# Patient Record
Sex: FEMALE | Race: WHITE | Employment: UNEMPLOYED | URBAN - METROPOLITAN AREA
[De-identification: names, ages, dates, MRNs, and addresses within clinical notes are randomized per-mention and may not be internally consistent; named-entity substitution may affect disease eponyms.]

---

## 2017-10-24 RX ORDER — MONTELUKAST SODIUM 10 MG/1
TABLET ORAL
Qty: 90 TAB | Status: SHIPPED | OUTPATIENT
Start: 2017-10-24 | End: 2019-01-16 | Stop reason: SDUPTHER

## 2018-01-17 ENCOUNTER — OFFICE VISIT (OUTPATIENT)
Dept: ORTHOPEDIC SURGERY | Age: 78
End: 2018-01-17

## 2018-01-17 VITALS
DIASTOLIC BLOOD PRESSURE: 81 MMHG | HEIGHT: 64 IN | HEART RATE: 87 BPM | OXYGEN SATURATION: 94 % | BODY MASS INDEX: 19.81 KG/M2 | WEIGHT: 116 LBS | SYSTOLIC BLOOD PRESSURE: 135 MMHG | RESPIRATION RATE: 16 BRPM

## 2018-01-17 DIAGNOSIS — M79.642 LEFT HAND PAIN: ICD-10-CM

## 2018-01-17 DIAGNOSIS — M18.12 PRIMARY OSTEOARTHRITIS OF FIRST CARPOMETACARPAL JOINT OF LEFT HAND: Primary | ICD-10-CM

## 2018-01-17 DIAGNOSIS — M79.89 SWELLING OF LEFT THUMB: ICD-10-CM

## 2018-01-17 DIAGNOSIS — M85.89 OSTEOPENIA OF MULTIPLE SITES: ICD-10-CM

## 2018-01-17 DIAGNOSIS — M10.9 ACUTE GOUT, UNSPECIFIED CAUSE, UNSPECIFIED SITE: ICD-10-CM

## 2018-01-17 RX ORDER — INDOMETHACIN 50 MG/1
50 CAPSULE ORAL 3 TIMES DAILY
Qty: 60 CAP | Refills: 2 | Status: SHIPPED | OUTPATIENT
Start: 2018-01-17 | End: 2018-01-18 | Stop reason: DRUGHIGH

## 2018-01-17 RX ORDER — BETAMETHASONE SODIUM PHOSPHATE AND BETAMETHASONE ACETATE 3; 3 MG/ML; MG/ML
6 INJECTION, SUSPENSION INTRA-ARTICULAR; INTRALESIONAL; INTRAMUSCULAR; SOFT TISSUE ONCE
Qty: 0.5 ML | Refills: 0
Start: 2018-01-17 | End: 2018-01-17

## 2018-01-17 RX ORDER — BUPIVACAINE HYDROCHLORIDE 2.5 MG/ML
0.5 INJECTION, SOLUTION EPIDURAL; INFILTRATION; INTRACAUDAL ONCE
Qty: 0.5 ML | Refills: 0
Start: 2018-01-17 | End: 2018-01-17

## 2018-01-17 NOTE — PROGRESS NOTES
Patient: Shahla Escobar                MRN: [id number]       SSN: [id number]  YOB: 1940        AGE: 68 y.o. SEX: female    PCP: Raina Hi MD  01/17/18    Chief Complaint   Patient presents with    Hand Pain     lt     HISTORY:  Shahla Escobar is a 68 y.o. female who is seen for acute left hand pain. She reports pain since 1/13/18 with no definite injury. She states she woke up with pain and swelling at the base of her left thumb. She denies any h/o gout. She was previously seen for left shoulder and neck pain. She notes increased cervical trapezius tenderness since 10/14/16. She states that she may have strained her shoulder and neck moving sandbags in preparation for the recent Zebedee Shah. She has been taking Motrin for pain with benefit. She was previously seen for right hand pain. She sustained work related injuries when she fell in a parking lot 9 years ago at the [place] [address] in [place]. She used her hands for brace her fall. She now has constant pain and stiffness in her right index finger. She uses her hands a great deal while playing piano and English hand bells. She also reports right foot pain, for which she sees Dr. Jose Gonzales. She has previously been treated for hip and neck pain which are much better. Pain Assessment  1/17/2018   Location of Pain Hand   Location Modifiers Left   Severity of Pain 6   Quality of Pain Throbbing;Aching   Duration of Pain Persistent   Frequency of Pain Intermittent   Aggravating Factors -   Limiting Behavior -   Relieving Factors -   Result of Injury No     Occupation, etc:  Ms. Leary Schirmer is her 's primary caregiver. Lethia Brunner had a major thoracic spinal procedure for correction of severe kyphosis at [facility]. She plays piano and English hand bells. She is right-handed. She has a twin sister who owns a house in [facility] [place] in [place]. 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 She also uses public transportation to get around. She has used public transportation since she retired 5 years ago. She dos not walk much anymore. She has recently lost about 5 pounds. Last 3 Recorded Weights in this Encounter    01/17/18 0916   Weight: 116 lb (52.6 kg)     Body mass index is 20.23 kg/(m^2). Patient Active Problem List   Diagnosis Code    Asthma J45.909    Allergic rhinitis J30.9     REVIEW OF SYSTEMS: All Below are Negative except: See HPI   Constitutional: negative for fever, chills, and weight loss. Cardiovascular: negative for chest pain, claudication, leg swelling, SOB, PACHECO   Gastrointestinal: Negative for pain, N/V/C/D, Blood in stool or urine, dysuria,  hematuria, incontinence, pelvic pain. Musculoskeletal: See HPI   Neurological: Negative for dizziness and weakness. Negative for headaches, Visual changes, confusion, seizures   Phychiatric/Behavioral: Negative for depression, memory loss, substance  abuse. Extremities: Negative for hair changes, rash, or skin lesion changes. Hematologic: Negative for bleeding problems, bruising, pallor or swollen lymph  nodes   Peripheral Vascular: No calf pain, no circulation deficits. Social History     Social History    Marital status:      Spouse name: N/A    Number of children: N/A    Years of education: N/A     Occupational History    Not on file.      Social History Main Topics    Smoking status: Never Smoker    Smokeless tobacco: Never Used    Alcohol use 2.0 oz/week     4 Glasses of wine per week    Drug use: No    Sexual activity: Not on file     Other Topics Concern    Not on file     Social History Narrative     Allergies   Allergen Reactions    Iodinated Contrast- Oral And Iv Dye Rash    Sulfa (Sulfonamide Antibiotics) Not Reported This Time and Hives    Tetracycline Hives     Current Outpatient Prescriptions   Medication Sig    montelukast (SINGULAIR) 10 mg tablet take 1 tablet by mouth daily at bedtime    PROAIR HFA 90 mcg/actuation inhaler inhale 2 puffs by mouth every 6 hours if needed    montelukast (SINGULAIR) 10 mg tablet take 1 tablet by mouth daily at bedtime    cholecalciferol (VITAMIN D3) 1,000 unit tablet 2,000 Units.  mometasone (NASONEX) 50 mcg/actuation nasal spray 2 sprays by Both Nostrils route daily.  aspirin 81 mg tablet Take 81 mg by mouth daily.  CALCIUM CARBONATE/VITAMIN D3 (CALCIUM + D PO) Take  by mouth daily.  CRANBERRY EXTRACT (CRANBERRY PO) Take  by mouth daily.  ascorbic acid (VITAMIN C) 1,000 mg tablet Take  by mouth daily.  ibuprofen (MOTRIN IB) 200 mg tablet Take  by mouth as needed.  simvastatin (ZOCOR) 5 mg tablet Take  by mouth nightly. No current facility-administered medications for this visit. PHYSICAL EXAMINATION:  Visit Vitals    /81 (BP 1 Location: Left arm, BP Patient Position: Sitting)    Pulse 87    Resp 16    Ht 5' 3.5\" (1.613 m)    Wt 116 lb (52.6 kg)    SpO2 94%    BMI 20.23 kg/m2     ORTHO EXAMINATION:  Examination Neck   Skin Intact   Tenderness +, left paracervical trapezius   Tightness +, left paracervical trapezius   Flexion Decreased 25%   Extension Decreased 25%   Lateral bend left Normal   Lateral bend right Normal   Masses -   Biceps reflex Normal   Triceps reflex Normal   Brachioradialis reflex Normal   Pain with neck extension and turning to left    Examination Right shoulder Left shoulder   Skin Intact Intact   Effusion - -   Biceps deformity - -   Atrophy - -   AC joint tenderness - -   Acromial tenderness + +   Biceps tenderness - -   Forward flexion/Elevation  160   Active abduction  160   External rotation ROM 30 30   Internal rotation ROM 70 70   Apprehension - -   Impingement - -   Drop Arm Test - -   Neurovascular Intact Intact     Examination Left Hand Right Hand   Skin Small linear scar from old injury.  Slight redness over basal joint and radial aspect of wrist Intact Deformity - +, bony enlargement, ulnar deviation of the DIP joint of the index finger   Swelling - -   Tenderness +, basal joint -   Finger flexion Full Full   Finger extension Full Full   Sensation Normal Normal   Capillary refill Normal Normal   Heberden's nodes + +   Dupuytren's - -   15-0 for DIP motion of the right index finger  +, mild thenar atrophy left     Examination Right Ankle/Foot   Skin Intact   Swelling -   Dorsiflexion 10   Plantarflexion 25   Deformity Large hallux valgus   Inversion laxity -   Anterior drawer -   Medial tenderness -   Lateral tenderness -   Heel cord Intact   Sensation Intact   Bunion +   Toe nails Normal   Capillary refill Normal     PROCEDURE:  After discussing treatment options, patient's left basal joint was injected with 1/2 cc Marcaine and 1/2 cc Celestone. Chart reviewed for the following:   Clara Vega MD, have reviewed the History, Physical and updated the Allergic reactions for [address] performed immediately prior to start of procedure:  Clara Vega MD, have performed the following reviews on [address] [place] [address] prior to the start of the procedure:            * Patient was identified by name and date of birth   * Agreement on procedure being performed was verified  * Risks and Benefits explained to the patient  * Procedure site verified and marked as necessary  * Patient was positioned for comfort  * Consent was obtained     Time: 10:13 AM     Date of procedure: 1/17/2018    Procedure performed by:  Farhana Ochoa MD    Ms. Lynn tolerated the procedure well with no complications. DEXA BONE DENSITY STUDY 1/17/12  IMPRESSION:  BMD MEASURES CONSISTENT WITH OSTEOPENIA. RADIOGRAPHS:  XR LEFT HAND 1/17/18  IMPRESSION:  Three views - No fractures, near complete basal joint space narrowing, osteopenia by xray.     XR LEFT SHOULDER 10/19/16  IMPRESSION: No fractures, mild acromioclavicular narrowing, no glenohumeral narrowing, no calcific densities, tiny inferior humeral head osteophyte. XR CERVICAL SPINE 10/19/16  IMPRESSION:  No fractures, C4-7 disc space narrowing, no prevertebral swelling, no subluxations, calcification of laryngeal cartilages. IMPRESSION:      ICD-10-CM ICD-9-CM    1. Primary osteoarthritis of first carpometacarpal joint of left hand M18.12 715.14 betamethasone (CELESTONE SOLUSPAN) 6 mg/mL injection      BETAMETHASONE ACETATE & SODIUM PHOSPHATE INJECTION 3 MG EA.      DRAIN/INJECT SMALL JOINT/BURSA      bupivacaine, PF, (MARCAINE, PF,) 0.25 % (2.5 mg/mL) injection      indomethacin (INDOCIN) 50 mg capsule      THUMB SPLINT   2. Left hand pain M79.642 729.5 AMB POC XRAY, HAND; 3+ VIEWS      indomethacin (INDOCIN) 50 mg capsule   3. Swelling of left thumb M79.89 729.81 indomethacin (INDOCIN) 50 mg capsule   4. Acute gout, unspecified cause, unspecified site M10.9 274.01 indomethacin (INDOCIN) 50 mg capsule      URIC ACID   5. Osteopenia of multiple sites M85.89 733.90      PLAN:   After discussing treatment options, patient's left basal joint was injected with 1/2 cc Marcaine and 1/2 cc Celestone. There is no need for surgery at this time. I will see her back as needed. She was provided with a prescription for indomethacin. She was provided with a lab slip for a uric acid value with next blood work at Dr. Venus Pritchard office. She was provided with a thumb spica splint today.      Scribed by Mecca Salvador ([facility]) as dictated by Roberta Petty MD

## 2018-01-17 NOTE — PATIENT INSTRUCTIONS
Thumb Arthritis: Exercises  Your Care Instructions  Here are some examples of exercises for thumb arthritis. Start each exercise slowly. Ease off the exercise if you start to have pain. Your doctor or your physical or occupational therapist will tell you when you can start these exercises and which ones will work best for you. How to do the exercises  Thumb IP flexion    1. Place your forearm and hand on a table with your affected thumb pointing up. 2. With your other hand, hold your thumb steady just below the joint nearest your thumbnail. 3. Bend the tip of your thumb downward, then straighten it. 4. Repeat 8 to 12 times. 5. Switch hands and repeat steps 1 through 4, even if only one thumb is sore. Thumb MP flexion    1. Place your forearm and hand on a table with your affected thumb pointing up. 2. With your other hand, hold the base of your thumb and palm steady. 3. Bend your thumb downward where it meets your palm, then straighten it. 4. Repeat 8 to 12 times. 5. Switch hands and repeat steps 1 through 4, even if only one thumb is sore. Thumb opposition    1. With your affected hand, point your fingers and thumb straight up. Your wrist should be relaxed, following the line of your fingers and thumb. 2. Touch your affected thumb to each finger, one finger at a time. This will look like an \"okay\" sign, but try to keep your other fingers straight and pointing upward as much as you can. 3. Repeat 8 to 12 times. 4. Switch hands and repeat steps 1 through 3, even if only one thumb is sore. Follow-up care is a key part of your treatment and safety. Be sure to make and go to all appointments, and call your doctor if you are having problems. It's also a good idea to know your test results and keep a list of the medicines you take. Where can you learn more? Go to http://zenia-dwayne.info/. Enter W468 in the search box to learn more about \"Thumb Arthritis: Exercises. \"  Current as of: March 21, 2017  Content Version: 11.4  © 2733-9618 [employer]. Care instructions adapted under license by [employer] (which disclaims liability or warranty for this information). If you have questions about a medical condition or this instruction, always ask your healthcare professional. [address] any warranty or liability for your use of this information. Indomethacin (By mouth)   Indomethacin (in-pelayo-METH-a-sin)  Treats pain. This is an NSAID. Brand Name(s): Indocin, Indocin SR, Tivorbex   There may be other brand names for this medicine. When This Medicine Should Not Be Used: This medicine is not right for everyone. Do not use it if you had an allergic reaction (including asthma) to indomethacin, aspirin, or other NSAIDs. Do not use it if you have had a heart surgery (such as coronary artery bypass graft). How to Use This Medicine:   Capsule, Long Acting Capsule, Liquid  · Take your medicine as directed. Your dose may need to be changed several times to find what works best for you. · It is best to take this medicine with food, milk, or antacids so it does not upset your stomach. · Swallow the capsule whole. Do not open, crush, break, or chew it. · Swallow the extended-release capsule whole. Do not crush, break, or chew it. · Oral liquid: Measure the oral liquid medicine with a marked measuring spoon, oral syringe, or medicine cup. Shake well before using. · This medicine should come with a Medication Guide. Ask your pharmacist for a copy if you do not have one. · Missed dose: Take a dose as soon as you remember. If it is almost time for your next dose, wait until then and take a regular dose. Do not take extra medicine to make up for a missed dose. · Store the medicine in a closed container at room temperature, away from heat, moisture, and direct light. Do not freeze the oral liquid.   Drugs and Foods to Avoid:   Ask your doctor or pharmacist before using any other medicine, including over-the-counter medicines, vitamins, and herbal products. · Do not use any other NSAID medicine unless your doctor says it is okay. Some other NSAIDs are aspirin, celecoxib, diclofenac, diflunisal, ibuprofen, naproxen, or salsalate. · Some foods and medicines can affect how indomethacin works. Tell your doctor if you are using any of the following:  ¨ Cyclosporine, digoxin, lithium, methotrexate, pemetrexed, or probenecid  ¨ Blood pressure medicine  ¨ Blood thinner (including warfarin)  ¨ Diuretic (water pill)  ¨ Medicine to treat depression  ¨ Steroid medicine  Warnings While Using This Medicine:   · Tell your doctor if you are pregnant or breastfeeding. Do not use this medicine during the later part of a pregnancy, unless your doctor tells you to. · Tell your doctor if you have kidney disease, liver disease, asthma, bleeding problems, heart disease, high blood pressure, heart failure, or a history of stomach or bowel problems (including bleeding or ulcers), depression, mental illness, epilepsy, or Parkinson disease. Tell your doctor if you smoke or drink alcohol. · This medicine may cause the following problems:  ¨ Higher risk of blood clots, heart attack, stroke, or heart failure  ¨ Bleeding and ulcers in your stomach or intestines  ¨ Liver damage  ¨ Kidney damage  ¨ Serious skin reactions  ¨ Changes in vision  · Ovulation may be delayed in some women while this medicine is being used. Talk to your doctor if you have concerns about this. · This medicine may make you drowsy. Do not drive or do anything else that could be dangerous until you know how this medicine affects you. · Your doctor will do lab tests at regular visits to check on the effects of this medicine. Keep all appointments. · Keep all medicine out of the reach of children. Never share your medicine with anyone.   Possible Side Effects While Using This Medicine:   Call your doctor right away if you notice any of these side effects:  · Allergic reaction: Itching or hives, swelling in your face or hands, swelling or tingling in your mouth or throat, chest tightness, trouble breathing  · Blistering, peeling, or red skin rash  · Bloody or black, tarry stools, severe stomach pain, vomiting blood or something that looks like coffee grounds  · Change in how much or how often you urinate  · Chest pain that may spread, trouble breathing, unusual sweating, fainting  · Dark urine or pale stools, nausea, vomiting, loss of appetite, stomach pain, yellow skin or eyes  · Numbness or weakness on one side of your body, sudden or severe headache, problems with vision, speech, or walking  · Rapid weight gain, swelling in your hands, ankles, or feet  · Unusual bleeding, bruising, or weakness  If you notice these less serious side effects, talk with your doctor:   · Constipation, gas, stomach pain  · Headache or drowsiness  If you notice other side effects that you think are caused by this medicine, tell your doctor. Call your doctor for medical advice about side effects. You may report side effects to FDA at 4-773-FDA-0379  © 2017 [address]  Information is for End User's use only and may not be sold, redistributed or otherwise used for commercial purposes. The above information is an  only. It is not intended as medical advice for individual conditions or treatments. Talk to your doctor, nurse or pharmacist before following any medical regimen to see if it is safe and effective for you.

## 2018-01-17 NOTE — MR AVS SNAPSHOT
[address] [facility], [address] [facility] 
[phone] Patient: Abimael Wheelre MRN:  [id number] Visit Information Date & Time Provider Department Dept. Phone Encounter #  
 1/17/2018  9:30 AM Kaylee Sloan MD [facility] [phone] [id number] Follow-up Instructions Return if symptoms worsen or fail to improve. Upcoming Health Maintenance Date Due COLONOSCOPY 7/21/1958 DTaP/Tdap/Td series (1 - Tdap) 7/21/1961 ZOSTER VACCINE AGE 60> 5/21/2000 GLAUCOMA SCREENING Q2Y 7/21/2005 Pneumococcal 65+ Low/Medium Risk (1 of 2 - PCV13) 7/21/2005 MEDICARE YEARLY EXAM 7/21/2005 Influenza Age 5 to Adult 8/1/2017 Allergies as of 1/17/2018  Review Complete On: 1/17/2018 By: Kaylee Sloan MD  
  
 Severity Noted Reaction Type Reactions Iodinated Contrast- Oral And Iv Dye  05/25/2016    Rash  
 Sulfa (Sulfonamide Antibiotics)    Not Reported This Time, Hives Tetracycline  05/25/2016    Hives Current Immunizations  Never Reviewed Name Date Pneumococcal Polysaccharide (PPSV-23)  Incomplete,  Incomplete Not reviewed this visit You Were Diagnosed With   
  
 Codes Comments Primary osteoarthritis of first carpometacarpal joint of left hand    -  Primary ICD-10-CM: M18.12 
ICD-9-CM: 715.14 Left hand pain     ICD-10-CM: W25.197 ICD-9-CM: 729.5 Swelling of left thumb     ICD-10-CM: M79.89 ICD-9-CM: 729.81 Acute gout, unspecified cause, unspecified site     ICD-10-CM: M10.9 ICD-9-CM: 274.01 Osteopenia of multiple sites     ICD-10-CM: M85.89 ICD-9-CM: 733.90 Vitals BP Pulse Resp Height(growth percentile) Weight(growth percentile) SpO2  
 135/81 (BP 1 Location: Left arm, BP Patient Position: Sitting) 87 16 5' 3.5\" (1.613 m) 116 lb (52.6 kg) 94% BMI OB Status Smoking Status 20.23 kg/m2 Hysterectomy Never Smoker Vitals History BMI and BSA Data Body Mass Index Body Surface Area  
 20.23 kg/m 2 1.54 m 2 Preferred Pharmacy Pharmacy Name Phone [facility] [address], [address] [facility] [phone] Your Updated Medication List  
  
   
This list is accurate as of: 1/17/18 10:30 AM.  Always use your most recent med list.  
  
  
  
  
 aspirin 81 mg tablet Take 81 mg by mouth daily. betamethasone 6 mg/mL injection Commonly known as:  CELESTONE SOLUSPAN  
1 mL by Intra artICUlar route once for 1 dose. bupivacaine (PF) 0.25 % (2.5 mg/mL) injection Commonly known as:  MARCAINE (PF)  
0.5 mL by Intra artICUlar route once for 1 dose. CALCIUM + D PO Take  by mouth daily. cholecalciferol 1,000 unit tablet Commonly known as:  VITAMIN D3  
2,000 Units. CRANBERRY PO Take  by mouth daily. indomethacin 50 mg capsule Commonly known as:  INDOCIN Take 1 Cap by mouth three (3) times daily for 90 days. mometasone 50 mcg/actuation nasal spray Commonly known as:  NASONEX  
2 sprays by Both Nostrils route daily. * montelukast 10 mg tablet Commonly known as:  SINGULAIR  
take 1 tablet by mouth daily at bedtime * montelukast 10 mg tablet Commonly known as:  SINGULAIR  
take 1 tablet by mouth daily at bedtime MOTRIN  mg tablet Generic drug:  ibuprofen Take  by mouth as needed. PROAIR HFA 90 mcg/actuation inhaler Generic drug:  albuterol  
inhale 2 puffs by mouth every 6 hours if needed VITAMIN C 1,000 mg tablet Generic drug:  ascorbic acid (vitamin C) Take  by mouth daily. ZOCOR 5 mg tablet Generic drug:  simvastatin Take  by mouth nightly. * Notice: This list has 2 medication(s) that are the same as other medications prescribed for you.  Read the directions carefully, and ask your doctor or other care provider to review them with you. Prescriptions Sent to Pharmacy Refills  
 indomethacin (INDOCIN) 50 mg capsule 2 Sig: Take 1 Cap by mouth three (3) times daily for 90 days. Class: Normal  
 Pharmacy: [id number] [address], 9 [facility] #: [phone] Route: Oral  
  
We Performed the Following AMB POC XRAY, HAND; 3+ VIEWS [58465 CPT(R)] BETAMETHASONE ACETATE & SODIUM PHOSPHATE INJECTION 3 MG EA. [ [facility]] DRAIN/INJECT SMALL JOINT/BURSA [id number] CPT(R)] THUMB SPLINT [CPL344 Custom] Comments: LEFT THUMB SPLINT Follow-up Instructions Return if symptoms worsen or fail to improve. To-Do List   
 01/17/2018 Lab:  URIC ACID Patient Instructions Thumb Arthritis: Exercises Your Care Instructions Here are some examples of exercises for thumb arthritis. Start each exercise slowly. Ease off the exercise if you start to have pain. Your doctor or your physical or occupational therapist will tell you when you can start these exercises and which ones will work best for you. How to do the exercises Thumb IP flexion 1. Place your forearm and hand on a table with your affected thumb pointing up. 2. With your other hand, hold your thumb steady just below the joint nearest your thumbnail. 3. Bend the tip of your thumb downward, then straighten it. 4. Repeat 8 to 12 times. 5. Switch hands and repeat steps 1 through 4, even if only one thumb is sore. Thumb MP flexion 1. Place your forearm and hand on a table with your affected thumb pointing up. 2. With your other hand, hold the base of your thumb and palm steady. 3. Bend your thumb downward where it meets your palm, then straighten it. 4. Repeat 8 to 12 times. 5. Switch hands and repeat steps 1 through 4, even if only one thumb is sore. Thumb opposition 1. With your affected hand, point your fingers and thumb straight up. Your wrist should be relaxed, following the line of your fingers and thumb. 2. Touch your affected thumb to each finger, one finger at a time. This will look like an \"okay\" sign, but try to keep your other fingers straight and pointing upward as much as you can. 3. Repeat 8 to 12 times. 4. Switch hands and repeat steps 1 through 3, even if only one thumb is sore. Follow-up care is a key part of your treatment and safety. Be sure to make and go to all appointments, and call your doctor if you are having problems. It's also a good idea to know your test results and keep a list of the medicines you take. Where can you learn more? Go to http://zenia-dwayne.info/. Enter A966 in the search box to learn more about \"Thumb Arthritis: Exercises. \" Current as of: March 21, 2017 Content Version: 11.4 © 6359-3259 [employer]. Care instructions adapted under license by [employer] (which disclaims liability or warranty for this information). If you have questions about a medical condition or this instruction, always ask your healthcare professional. [place] [address] any warranty or liability for your use of this information. Indomethacin (By mouth) Indomethacin (in-pelayo-METH-a-sin) Treats pain. This is an NSAID. Brand Name(s): Indocin, Indocin SR, Tivorbex There may be other brand names for this medicine. When This Medicine Should Not Be Used: This medicine is not right for everyone. Do not use it if you had an allergic reaction (including asthma) to indomethacin, aspirin, or other NSAIDs. Do not use it if you have had a heart surgery (such as coronary artery bypass graft). How to Use This Medicine:  
Capsule, Long Acting Capsule, Liquid · Take your medicine as directed. Your dose may need to be changed several times to find what works best for you. · It is best to take this medicine with food, milk, or antacids so it does not upset your stomach. · Swallow the capsule whole. Do not open, crush, break, or chew it. · Swallow the extended-release capsule whole. Do not crush, break, or chew it. · Oral liquid: Measure the oral liquid medicine with a marked measuring spoon, oral syringe, or medicine cup. Shake well before using. · This medicine should come with a Medication Guide. Ask your pharmacist for a copy if you do not have one. · Missed dose: Take a dose as soon as you remember. If it is almost time for your next dose, wait until then and take a regular dose. Do not take extra medicine to make up for a missed dose. · Store the medicine in a closed container at room temperature, away from heat, moisture, and direct light. Do not freeze the oral liquid. Drugs and Foods to Avoid: Ask your doctor or pharmacist before using any other medicine, including over-the-counter medicines, vitamins, and herbal products. · Do not use any other NSAID medicine unless your doctor says it is okay. Some other NSAIDs are aspirin, celecoxib, diclofenac, diflunisal, ibuprofen, naproxen, or salsalate. · Some foods and medicines can affect how indomethacin works. Tell your doctor if you are using any of the following: ¨ Cyclosporine, digoxin, lithium, methotrexate, pemetrexed, or probenecid ¨ Blood pressure medicine ¨ Blood thinner (including warfarin) ¨ Diuretic (water pill) ¨ Medicine to treat depression Nida Felder Steroid medicine Warnings While Using This Medicine: · Tell your doctor if you are pregnant or breastfeeding. Do not use this medicine during the later part of a pregnancy, unless your doctor tells you to.  
· Tell your doctor if you have kidney disease, liver disease, asthma, bleeding problems, heart disease, high blood pressure, heart failure, or a history of stomach or bowel problems (including bleeding or ulcers), depression, mental illness, epilepsy, or Parkinson disease. Tell your doctor if you smoke or drink alcohol. · This medicine may cause the following problems: 
¨ Higher risk of blood clots, heart attack, stroke, or heart failure ¨ Bleeding and ulcers in your stomach or intestines ¨ Liver damage ¨ Kidney damage ¨ Serious skin reactions ¨ Changes in vision · Ovulation may be delayed in some women while this medicine is being used. Talk to your doctor if you have concerns about this. · This medicine may make you drowsy. Do not drive or do anything else that could be dangerous until you know how this medicine affects you. · Your doctor will do lab tests at regular visits to check on the effects of this medicine. Keep all appointments. · Keep all medicine out of the reach of children. Never share your medicine with anyone. Possible Side Effects While Using This Medicine:  
Call your doctor right away if you notice any of these side effects: · Allergic reaction: Itching or hives, swelling in your face or hands, swelling or tingling in your mouth or throat, chest tightness, trouble breathing · Blistering, peeling, or red skin rash · Bloody or black, tarry stools, severe stomach pain, vomiting blood or something that looks like coffee grounds · Change in how much or how often you urinate · Chest pain that may spread, trouble breathing, unusual sweating, fainting · Dark urine or pale stools, nausea, vomiting, loss of appetite, stomach pain, yellow skin or eyes · Numbness or weakness on one side of your body, sudden or severe headache, problems with vision, speech, or walking · Rapid weight gain, swelling in your hands, ankles, or feet · Unusual bleeding, bruising, or weakness If you notice these less serious side effects, talk with your doctor: · Constipation, gas, stomach pain · Headache or drowsiness If you notice other side effects that you think are caused by this medicine, tell your doctor. Call your doctor for medical advice about side effects. You may report side effects to FDA at 4-140-FDA-2271 © 2017 [address] Information is for End User's use only and may not be sold, redistributed or otherwise used for commercial purposes. The above information is an  only. It is not intended as medical advice for individual conditions or treatments. Talk to your doctor, nurse or pharmacist before following any medical regimen to see if it is safe and effective for you. Introducing [facility]! [facility] introduces Zane Prep patient portal. Now you can access parts of your medical record, email your doctor's office, and request medication refills online. 1. In your internet browser, go to [email or web address] 2. Click on the First Time User? Click Here link in the Sign In box. You will see the New Member Sign Up page. 3. Enter your Zane Prep Access Code exactly as it appears below. You will not need to use this code after youve completed the sign-up process. If you do not sign up before the expiration date, you must request a new code. · Zane Prep Access Code: [email or web address] Expires: 4/17/2018 10:30 AM 
 
4. Enter the last four digits of your Social Security Number (xxxx) and Date of Birth (mm/dd/yyyy) as indicated and click Submit. You will be taken to the next sign-up page. 5. Create a Zane Prep ID. This will be your Zane Prep login ID and cannot be changed, so think of one that is secure and easy to remember. 6. Create a Zane Prep password. You can change your password at any time. 7. Enter your Password Reset Question and Answer. This can be used at a later time if you forget your password. 8. Enter your e-mail address. You will receive e-mail notification when new information is available in [address] 9. Click Sign Up. You can now view and download portions of your medical record. 10. Click the Download Summary menu link to download a portable copy of your medical information. If you have questions, please visit the Frequently Asked Questions section of the [employer] website. Remember, [employer]One is NOT to be used for urgent needs. For medical emergencies, dial 911. Now available from your iPhone and Android! Please provide this summary of care documentation to your next provider. Your primary care clinician is listed as [place] [facility]. If you have any questions after today's visit, please call [phone].

## 2018-01-18 ENCOUNTER — TELEPHONE (OUTPATIENT)
Dept: ORTHOPEDIC SURGERY | Age: 78
End: 2018-01-18

## 2018-01-18 RX ORDER — INDOMETHACIN 50 MG/1
50 CAPSULE ORAL 2 TIMES DAILY WITH MEALS
Qty: 60 CAP | Refills: 1
Start: 2018-01-18 | End: 2018-03-19

## 2018-01-18 NOTE — TELEPHONE ENCOUNTER
Called and spoke to juancho Tomas at [facility] regarding this message. States that they are unable to fill due to insurance needing a prior auth for this medication. States the paperwork should have been faxed to the office. Called and spoke to patient. She doesn't want this medication as her motrin is working just fine. She is not sure why 50mg was sent in, but her Motrin is working fine for her    Will call Nidia back at the pharmacy to let her know to d/c this medication.

## 2018-01-18 NOTE — TELEPHONE ENCOUNTER
Patient says she took her Indocin rx into [facility] yesterday and they are unable to fill it. She doesn't know why except that the pharmacy said they are waiting on us to call back. She is very upset and doesn't want to wait all day. She thinks this is ridiculous and someone needs to call the pharmacy and her back immediately.   Patient can be reached at [phone]

## 2018-01-22 ENCOUNTER — HOSPITAL ENCOUNTER (OUTPATIENT)
Dept: LAB | Age: 78
Discharge: HOME | End: 2018-01-22
Payer: MEDICARE

## 2018-01-22 DIAGNOSIS — M10.9 ACUTE GOUT, UNSPECIFIED CAUSE, UNSPECIFIED SITE: ICD-10-CM

## 2018-01-22 LAB — URATE SERPL-MCNC: 2.9 MG/DL (ref 2.6–7.2)

## 2018-01-22 PROCEDURE — 36415 COLL VENOUS BLD VENIPUNCTURE: CPT | Performed by: SPECIALIST

## 2018-01-22 PROCEDURE — 84550 ASSAY OF BLOOD/URIC ACID: CPT | Performed by: SPECIALIST

## 2018-01-24 ENCOUNTER — OFFICE VISIT (OUTPATIENT)
Dept: ORTHOPEDIC SURGERY | Age: 78
End: 2018-01-24

## 2018-01-24 VITALS
SYSTOLIC BLOOD PRESSURE: 136 MMHG | OXYGEN SATURATION: 97 % | TEMPERATURE: 97.6 F | DIASTOLIC BLOOD PRESSURE: 56 MMHG | HEART RATE: 71 BPM | WEIGHT: 116 LBS | BODY MASS INDEX: 19.81 KG/M2 | HEIGHT: 64 IN

## 2018-01-24 DIAGNOSIS — M85.89 OSTEOPENIA OF MULTIPLE SITES: ICD-10-CM

## 2018-01-24 DIAGNOSIS — M79.642 LEFT HAND PAIN: Primary | ICD-10-CM

## 2018-01-24 DIAGNOSIS — M79.89 SWELLING OF LEFT THUMB: ICD-10-CM

## 2018-01-24 DIAGNOSIS — M18.12 PRIMARY OSTEOARTHRITIS OF FIRST CARPOMETACARPAL JOINT OF LEFT HAND: ICD-10-CM

## 2018-01-24 NOTE — PROGRESS NOTES
Patient: Shorty Velazquez                MRN: [id number]       SSN: [id number]  YOB: 1940        AGE: 68 y.o. SEX: female    PCP: Darci Wing MD  01/24/18    Chief Complaint   Patient presents with    Follow-up     left hand     HISTORY:  Shorty Velazquez is a 68 y.o. female who is seen for continued left hand pain. She reports pain since 1/13/18 with no definite injury. She states she woke up with pain and swelling at the base of her left thumb. She denies any h/o gout. She denies much relief from splinting or NSAIDS including indomethacin. Tylenol has worked in the past.    She was previously seen for left shoulder and neck pain. She notes increased cervical trapezius tenderness since 10/14/16. She states that she may have strained her shoulder and neck moving sandbags in preparation for the recent [place]. She has been taking Motrin for pain with benefit. She was previously seen for right hand pain. She sustained work related injuries when she fell in a parking lot 9 years ago at the [place] [address] in [facility]. She used her hands for brace her fall. She now has constant pain and stiffness in her right index finger. She uses her hands a great deal while playing piano and English hand bells. She also reports right foot pain, for which she sees Dr. Boni Goodman. She has previously been treated for hip and neck pain which are much better. Pain Assessment  1/24/2018   Location of Pain Hand   Location Modifiers Left   Severity of Pain 6   Quality of Pain Aching   Duration of Pain Persistent   Frequency of Pain Constant   Aggravating Factors Other (Comment)   Aggravating Factors Comment moving hand   Limiting Behavior Some   Relieving Factors NSAID   Result of Injury -     Occupation, etc:  Ms. Jyothi Rivera is her 's primary caregiver. Rubi Campbell had a major thoracic spinal procedure for correction of severe kyphosis at [facility].   She plays piano and English hand lili.  She is right-handed. She has a twin sister who owns a house in [place] [facility] [place] in [place]. 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 She also uses public transportation to get around. She has used public transportation since she retired 5 years ago. She dos not walk much anymore. She has recently lost about 5 pounds. Last 3 Recorded Weights in this Encounter    01/24/18 1057   Weight: 116 lb (52.6 kg)     Body mass index is 20.23 kg/(m^2). Patient Active Problem List   Diagnosis Code    Asthma J45.909    Allergic rhinitis J30.9     REVIEW OF SYSTEMS: All Below are Negative except: See HPI   Constitutional: negative for fever, chills, and weight loss. Cardiovascular: negative for chest pain, claudication, leg swelling, SOB, PACHECO   Gastrointestinal: Negative for pain, N/V/C/D, Blood in stool or urine, dysuria,  hematuria, incontinence, pelvic pain. Musculoskeletal: See HPI   Neurological: Negative for dizziness and weakness. Negative for headaches, Visual changes, confusion, seizures   Phychiatric/Behavioral: Negative for depression, memory loss, substance  abuse. Extremities: Negative for hair changes, rash, or skin lesion changes. Hematologic: Negative for bleeding problems, bruising, pallor or swollen lymph  nodes   Peripheral Vascular: No calf pain, no circulation deficits. Social History     Social History    Marital status:      Spouse name: N/A    Number of children: N/A    Years of education: N/A     Occupational History    Not on file.      Social History Main Topics    Smoking status: Never Smoker    Smokeless tobacco: Never Used    Alcohol use 2.0 oz/week     4 Glasses of wine per week    Drug use: No    Sexual activity: Not on file     Other Topics Concern    Not on file     Social History Narrative     Allergies   Allergen Reactions    Iodinated Contrast- Oral And Iv Dye Rash    Sulfa (Sulfonamide Antibiotics) Not Reported This Time and Hives    Tetracycline Hives     Current Outpatient Prescriptions   Medication Sig    indomethacin (INDOCIN) 50 mg capsule Take 1 Cap by mouth two (2) times daily (with meals) for 60 days.  montelukast (SINGULAIR) 10 mg tablet take 1 tablet by mouth daily at bedtime    PROAIR HFA 90 mcg/actuation inhaler inhale 2 puffs by mouth every 6 hours if needed    montelukast (SINGULAIR) 10 mg tablet take 1 tablet by mouth daily at bedtime    cholecalciferol (VITAMIN D3) 1,000 unit tablet 2,000 Units.  mometasone (NASONEX) 50 mcg/actuation nasal spray 2 sprays by Both Nostrils route daily.  aspirin 81 mg tablet Take 81 mg by mouth daily.  CALCIUM CARBONATE/VITAMIN D3 (CALCIUM + D PO) Take  by mouth daily.  CRANBERRY EXTRACT (CRANBERRY PO) Take  by mouth daily.  ascorbic acid (VITAMIN C) 1,000 mg tablet Take  by mouth daily.  ibuprofen (MOTRIN IB) 200 mg tablet Take  by mouth as needed.  simvastatin (ZOCOR) 5 mg tablet Take  by mouth nightly. No current facility-administered medications for this visit.       PHYSICAL EXAMINATION:  Visit Vitals    /56    Pulse 71    Temp 97.6 °F (36.4 °C) (Oral)    Ht 5' 3.5\" (1.613 m)    Wt 116 lb (52.6 kg)    SpO2 97%    BMI 20.23 kg/m2     ORTHO EXAMINATION:  Examination Neck   Skin Intact   Tenderness +, left paracervical trapezius   Tightness +, left paracervical trapezius   Flexion Decreased 25%   Extension Decreased 25%   Lateral bend left Normal   Lateral bend right Normal   Masses -   Biceps reflex Normal   Triceps reflex Normal   Brachioradialis reflex Normal   Pain with neck extension and turning to left    Examination Right shoulder Left shoulder   Skin Intact Intact   Effusion - -   Biceps deformity - -   Atrophy - -   AC joint tenderness - -   Acromial tenderness + +   Biceps tenderness - -   Forward flexion/Elevation  160   Active abduction  160   External rotation ROM 30 30   Internal rotation ROM 70 70   Apprehension - -   Impingement - -   Drop Arm Test - -   Neurovascular Intact Intact     Examination Left Hand Right Hand   Skin Small linear scar from old injury. Slight redness over basal joint and radial aspect of wrist Intact   Deformity - +, bony enlargement, ulnar deviation of the DIP joint of the index finger   Swelling - -   Tenderness +, basal joint -   Finger flexion Full Full   Finger extension Full Full   Sensation Normal Normal   Capillary refill Normal Normal   Heberden's nodes + +   Dupuytren's - -   15-0 for DIP motion of the right index finger  +, mild thenar atrophy left     Examination Right Ankle/Foot   Skin Intact   Swelling -   Dorsiflexion 10   Plantarflexion 25   Deformity Large hallux valgus   Inversion laxity -   Anterior drawer -   Medial tenderness -   Lateral tenderness -   Heel cord Intact   Sensation Intact   Bunion +   Toe nails Normal   Capillary refill Normal     LABS:   Component      Latest Ref Rng & Units 1/22/2018           2:01 PM   Uric acid      2.6 - 7.2 MG/DL 2.9     DEXA BONE DENSITY STUDY 1/17/12  IMPRESSION:  BMD MEASURES CONSISTENT WITH OSTEOPENIA. RADIOGRAPHS:  XR LEFT HAND 1/17/18  IMPRESSION:  Three views - No fractures, near complete basal joint space narrowing, osteopenia by xray. XR LEFT SHOULDER 10/19/16  IMPRESSION: No fractures, mild acromioclavicular narrowing, no glenohumeral narrowing, no calcific densities, tiny inferior humeral head osteophyte. XR CERVICAL SPINE 10/19/16  IMPRESSION:  No fractures, C4-7 disc space narrowing, no prevertebral swelling, no subluxations, calcification of laryngeal cartilages. IMPRESSION:      ICD-10-CM ICD-9-CM    1. Left hand pain M79.642 729.5    2. Primary osteoarthritis of first carpometacarpal joint of left hand M18.12 715.14    3. Swelling of left thumb M79.89 729.81    4.  Osteopenia of multiple sites M85.89 733.90      PLAN:   She will follow up with a hand surgeon for a possible basal joint arthoplasty. Restart use of Tylenol.     Scribed by Boubacar Lozano ([facility]) as dictated by Brian An MD

## 2018-01-24 NOTE — PATIENT INSTRUCTIONS
Thumb Arthritis: Exercises  Your Care Instructions  Here are some examples of exercises for thumb arthritis. Start each exercise slowly. Ease off the exercise if you start to have pain. Your doctor or your physical or occupational therapist will tell you when you can start these exercises and which ones will work best for you. How to do the exercises  Thumb IP flexion    1. Place your forearm and hand on a table with your affected thumb pointing up. 2. With your other hand, hold your thumb steady just below the joint nearest your thumbnail. 3. Bend the tip of your thumb downward, then straighten it. 4. Repeat 8 to 12 times. 5. Switch hands and repeat steps 1 through 4, even if only one thumb is sore. Thumb MP flexion    1. Place your forearm and hand on a table with your affected thumb pointing up. 2. With your other hand, hold the base of your thumb and palm steady. 3. Bend your thumb downward where it meets your palm, then straighten it. 4. Repeat 8 to 12 times. 5. Switch hands and repeat steps 1 through 4, even if only one thumb is sore. Thumb opposition    1. With your affected hand, point your fingers and thumb straight up. Your wrist should be relaxed, following the line of your fingers and thumb. 2. Touch your affected thumb to each finger, one finger at a time. This will look like an \"okay\" sign, but try to keep your other fingers straight and pointing upward as much as you can. 3. Repeat 8 to 12 times. 4. Switch hands and repeat steps 1 through 3, even if only one thumb is sore. Follow-up care is a key part of your treatment and safety. Be sure to make and go to all appointments, and call your doctor if you are having problems. It's also a good idea to know your test results and keep a list of the medicines you take. Where can you learn more? Go to http://zenia-dwayne.info/. Enter H686 in the search box to learn more about \"Thumb Arthritis: Exercises. \"  Current as of: March 21, 2017  Content Version: 11.4  © 1042-7786 [employer]wise, Incorporated. Care instructions adapted under license by [employer] (which disclaims liability or warranty for this information). If you have questions about a medical condition or this instruction, always ask your healthcare professional. [address] any warranty or liability for your use of this information.

## 2018-01-24 NOTE — MR AVS SNAPSHOT
[address] [place] [address], [address] 726 [facility] 
[phone] Patient: Shahla Escobar MRN:  [id number] Visit Information Date & Time Provider Department Dept. Phone Encounter #  
 1/24/2018 11:00 AM Mike Sewell, 27 [facility] [phone] [id number] Follow-up Instructions Return if symptoms worsen or fail to improve. Upcoming Health Maintenance Date Due COLONOSCOPY 7/21/1958 DTaP/Tdap/Td series (1 - Tdap) 7/21/1961 ZOSTER VACCINE AGE 60> 5/21/2000 GLAUCOMA SCREENING Q2Y 7/21/2005 Pneumococcal 65+ Low/Medium Risk (1 of 2 - PCV13) 7/21/2005 MEDICARE YEARLY EXAM 7/21/2005 Influenza Age 5 to Adult 8/1/2017 Allergies as of 1/24/2018  Review Complete On: 1/24/2018 By: Mike Sewell MD  
  
 Severity Noted Reaction Type Reactions Iodinated Contrast- Oral And Iv Dye  05/25/2016    Rash  
 Sulfa (Sulfonamide Antibiotics)    Not Reported This Time, Hives Tetracycline  05/25/2016    Hives Current Immunizations  Never Reviewed Name Date Pneumococcal Polysaccharide (PPSV-23)  Incomplete,  Incomplete Not reviewed this visit You Were Diagnosed With   
  
 Codes Comments Left hand pain    -  Primary ICD-10-CM: Q46.897 ICD-9-CM: 729.5 Primary osteoarthritis of first carpometacarpal joint of left hand     ICD-10-CM: M18.12 
ICD-9-CM: 715.14 Swelling of left thumb     ICD-10-CM: M79.89 ICD-9-CM: 729.81 Osteopenia of multiple sites     ICD-10-CM: M85.89 ICD-9-CM: 733.90 Vitals BP Pulse Temp Height(growth percentile) Weight(growth percentile) SpO2  
 136/56 71 97.6 °F (36.4 °C) (Oral) 5' 3.5\" (1.613 m) 116 lb (52.6 kg) 97% BMI OB Status Smoking Status 20.23 kg/m2 Hysterectomy Never Smoker BMI and BSA Data  Body Mass Index Body Surface Area  
 20.23 kg/m 2 1.54 m 2  
  
  
 Preferred Pharmacy Pharmacy Name Phone [facility] [address] [facility] [phone] Your Updated Medication List  
  
   
This list is accurate as of: 1/24/18 12:11 PM.  Always use your most recent med list.  
  
  
  
  
 aspirin 81 mg tablet Take 81 mg by mouth daily. CALCIUM + D PO Take  by mouth daily. cholecalciferol 1,000 unit tablet Commonly known as:  VITAMIN D3  
2,000 Units. CRANBERRY PO Take  by mouth daily. indomethacin 50 mg capsule Commonly known as:  INDOCIN Take 1 Cap by mouth two (2) times daily (with meals) for 60 days. mometasone 50 mcg/actuation nasal spray Commonly known as:  NASONEX  
2 sprays by Both Nostrils route daily. * montelukast 10 mg tablet Commonly known as:  SINGULAIR  
take 1 tablet by mouth daily at bedtime * montelukast 10 mg tablet Commonly known as:  SINGULAIR  
take 1 tablet by mouth daily at bedtime MOTRIN  mg tablet Generic drug:  ibuprofen Take  by mouth as needed. PROAIR HFA 90 mcg/actuation inhaler Generic drug:  albuterol  
inhale 2 puffs by mouth every 6 hours if needed VITAMIN C 1,000 mg tablet Generic drug:  ascorbic acid (vitamin C) Take  by mouth daily. ZOCOR 5 mg tablet Generic drug:  simvastatin Take  by mouth nightly. * Notice: This list has 2 medication(s) that are the same as other medications prescribed for you. Read the directions carefully, and ask your doctor or other care provider to review them with you. We Performed the Following REFERRAL TO ORTHOPEDICS [[id number] Custom] Follow-up Instructions Return if symptoms worsen or fail to improve. Referral Information Referral ID Referred By Referred To  
  
 [id number] Soraida GOODWIN Not Available Visits Status Start Date End Date 1 New Request 1/24/18 1/24/19 If your referral has a status of pending review or denied, additional information will be sent to support the outcome of this decision. Patient Instructions Thumb Arthritis: Exercises Your Care Instructions Here are some examples of exercises for thumb arthritis. Start each exercise slowly. Ease off the exercise if you start to have pain. Your doctor or your physical or occupational therapist will tell you when you can start these exercises and which ones will work best for you. How to do the exercises Thumb IP flexion 1. Place your forearm and hand on a table with your affected thumb pointing up. 2. With your other hand, hold your thumb steady just below the joint nearest your thumbnail. 3. Bend the tip of your thumb downward, then straighten it. 4. Repeat 8 to 12 times. 5. Switch hands and repeat steps 1 through 4, even if only one thumb is sore. Thumb MP flexion 1. Place your forearm and hand on a table with your affected thumb pointing up. 2. With your other hand, hold the base of your thumb and palm steady. 3. Bend your thumb downward where it meets your palm, then straighten it. 4. Repeat 8 to 12 times. 5. Switch hands and repeat steps 1 through 4, even if only one thumb is sore. Thumb opposition 1. With your affected hand, point your fingers and thumb straight up. Your wrist should be relaxed, following the line of your fingers and thumb. 2. Touch your affected thumb to each finger, one finger at a time. This will look like an \"okay\" sign, but try to keep your other fingers straight and pointing upward as much as you can. 3. Repeat 8 to 12 times. 4. Switch hands and repeat steps 1 through 3, even if only one thumb is sore. Follow-up care is a key part of your treatment and safety. Be sure to make and go to all appointments, and call your doctor if you are having problems. It's also a good idea to know your test results and keep a list of the medicines you take. Where can you learn more? Go to http://zenia-dwayne.info/. Enter Z967 in the search box to learn more about \"Thumb Arthritis: Exercises. \" Current as of: March 21, 2017 Content Version: 11.4 © 0045-5776 Healthwise, Incorporated. Care instructions adapted under license by [employer] (which disclaims liability or warranty for this information). If you have questions about a medical condition or this instruction, always ask your healthcare professional. [address] any warranty or liability for your use of this information. Please provide this summary of care documentation to your next provider. Your primary care clinician is listed as Isis Dumont. If you have any questions after today's visit, please call [phone].

## 2018-01-24 NOTE — TELEPHONE ENCOUNTER
Angelica Jesus ([place]: [id number])    This request has been approved.     CaseId: [id number]  Product Name: Indocin   Status: Approved  Coverage Start Date: 12/25/2017  Coverage End Date: 01/24/2019

## 2018-04-23 RX ORDER — ALBUTEROL SULFATE 90 UG/1
AEROSOL, METERED RESPIRATORY (INHALATION)
Qty: 1 INHALER | Refills: 3 | Status: SHIPPED | OUTPATIENT
Start: 2018-04-23

## 2019-01-17 RX ORDER — MONTELUKAST SODIUM 10 MG/1
TABLET ORAL
Qty: 90 TAB | Status: SHIPPED | OUTPATIENT
Start: 2019-01-17 | End: 2020-01-15

## 2020-01-15 RX ORDER — MONTELUKAST SODIUM 10 MG/1
TABLET ORAL
Qty: 90 TAB | Status: SHIPPED | OUTPATIENT
Start: 2020-01-15

## 2023-01-03 ENCOUNTER — TRANSCRIBE ORDER (OUTPATIENT)
Dept: SCHEDULING | Age: 83
End: 2023-01-03

## 2023-01-03 DIAGNOSIS — R22.1 NECK MASS: Primary | ICD-10-CM
